# Patient Record
Sex: MALE | Race: WHITE | URBAN - METROPOLITAN AREA
[De-identification: names, ages, dates, MRNs, and addresses within clinical notes are randomized per-mention and may not be internally consistent; named-entity substitution may affect disease eponyms.]

---
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
## 2025-04-11 RX ORDER — DEXBROMPHENIRAMINE MALEATE, PHENYLEPHRINE HYDROCHLORIDE 2; 7.5 MG/1; MG/1
2 TABLET ORAL 3 TIMES DAILY PRN
Qty: 60 TABLET | Refills: 0 | Status: SHIPPED | OUTPATIENT
Start: 2025-04-11

## 2025-04-14 ENCOUNTER — OFFICE VISIT (OUTPATIENT)
Dept: ORTHOPEDICS | Facility: CLINIC | Age: 20
End: 2025-04-14
Payer: MEDICAID

## 2025-04-14 ENCOUNTER — HOSPITAL ENCOUNTER (OUTPATIENT)
Dept: RADIOLOGY | Facility: HOSPITAL | Age: 20
Discharge: HOME | End: 2025-04-14
Attending: NURSE PRACTITIONER
Payer: MEDICAID

## 2025-04-14 VITALS
OXYGEN SATURATION: 98 % | SYSTOLIC BLOOD PRESSURE: 111 MMHG | WEIGHT: 172.81 LBS | TEMPERATURE: 98 F | RESPIRATION RATE: 18 BRPM | HEART RATE: 58 BPM | BODY MASS INDEX: 24.74 KG/M2 | HEIGHT: 70 IN | DIASTOLIC BLOOD PRESSURE: 72 MMHG

## 2025-04-14 DIAGNOSIS — M23.92 KNEE INTERNAL DERANGEMENT, LEFT: Primary | ICD-10-CM

## 2025-04-14 DIAGNOSIS — M23.92 INTERNAL DERANGEMENT OF LEFT KNEE: ICD-10-CM

## 2025-04-14 PROCEDURE — 3008F BODY MASS INDEX DOCD: CPT | Mod: CPTII,,, | Performed by: NURSE PRACTITIONER

## 2025-04-14 PROCEDURE — 3078F DIAST BP <80 MM HG: CPT | Mod: CPTII,,, | Performed by: NURSE PRACTITIONER

## 2025-04-14 PROCEDURE — 73564 X-RAY EXAM KNEE 4 OR MORE: CPT | Mod: TC,LT

## 2025-04-14 PROCEDURE — 3074F SYST BP LT 130 MM HG: CPT | Mod: CPTII,,, | Performed by: NURSE PRACTITIONER

## 2025-04-14 PROCEDURE — 1160F RVW MEDS BY RX/DR IN RCRD: CPT | Mod: CPTII,,, | Performed by: NURSE PRACTITIONER

## 2025-04-14 PROCEDURE — 1159F MED LIST DOCD IN RCRD: CPT | Mod: CPTII,,, | Performed by: NURSE PRACTITIONER

## 2025-04-14 PROCEDURE — 99203 OFFICE O/P NEW LOW 30 MIN: CPT | Mod: S$PBB,,, | Performed by: NURSE PRACTITIONER

## 2025-04-14 PROCEDURE — 99214 OFFICE O/P EST MOD 30 MIN: CPT | Mod: PBBFAC,25 | Performed by: NURSE PRACTITIONER

## 2025-04-14 RX ORDER — LEVOCETIRIZINE DIHYDROCHLORIDE 5 MG/1
TABLET, FILM COATED ORAL
COMMUNITY

## 2025-04-14 NOTE — PROGRESS NOTES
"[facility]  Subjective:   PATIENT ID: Marco A Huertas is a 19 y.o. male.   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  Improved over time and last week while playing volley ball re-injure with same symptoms.     Onset: 1 year with acute injury 5 days ago constant   Modifying Factors: exacerbated by:  increased activity, prolonged sitting, prolonged walking/ standing improved with:  movement in less than 30 minutes , rest   Associated Symptoms:  [] joint locking  [x] joint catching  [x] decreased ROM  [x] crepitus [x] Weakness/ "giving out"  [] falls  [x] swelling  [x] difficult sleeping s/t pain        Activity: sedentary with light activity and pain moderately interferes with ADLs, assistive device none   Previous Treatments:  [x] HEP > 6 weeks  [] RX PT   [] Off-loading brace  [x] Hinged knee splint [x] OTC Pain Relievers: Tylenol, ibuprofen  [x] RX Medications: Naproxen  [] CSI   [] Hyaluronic acid injections    PMH:  non-smoker   Family History: unknown    NOTE: New patient referred for left knee pain with noted history of some conservative treatments.  Symptoms affecting ADLs.   Employment HX: construction, currently employed.     Current Medications[1]  Review of patient's allergies indicates:  No Known Allergies  REVIEW OF SYSTEMS:  A ten-point review of systems was performed and is negative, except as mentioned above   Objective:   Body mass index is 24.8 kg/m².   Vitals:    04/14/25 1313   BP: 111/72   Pulse: (!) 58   Resp: 18   Temp: 97.7 °F (36.5 °C)   TempSrc: Oral   SpO2: 98%   Weight: 78.4 kg (172 lb 13.5 oz)   Height: 5' 10" (1.778 m)   PainSc:   2   PainLoc: Knee     MSK Knee Exam  General:  no apparent distress, no pain indicators,  well " nourished  Inspection: lower extremities in proportion with overall body habitus, no erythema   ,   limping gait w/ weight bearing full  LEFT KNEE RIGHT KNEE   [x] Swelling moderate  [] Varus deformity  [] Rash [] Contusion  [] Mass  [] Scars [] Swelling  [] Varus deformity  [] Rash [] Contusion  [] Mass  [] Scars   Palpation:     LEFT KNEE RIGHT KNEE   Joint Warmth: normal  POMT: medial joint line,     [] Patellar grind with compression  [x] Crepitus  [x] Joint effusion  [x] Quad atrophy  [] Active mechanical locking with joint movement  [x] Popliteal fullness  [x] Tenderness medial joint line  [x] Tenderness lateral joint line  [] Tenderness of tibial plateau   [] Tenderness of patellar tendon  [] Tenderness of quadriceps tendon  [] Tenderness of prepatellar   [] Tenderness of infrapatellar  [] Tenderness of anserine bursae  [x] Tenderness of patellar facet  [] Tenderness over lateral retinaculum Joint Warmth: normal  POMT: none  [] Patellar grind with compression  [] Crepitus  [] Joint effusion  [] Quad atrophy  [] Active mechanical locking with joint movement  [] Popliteal fullness  [] Tenderness medial joint line  [] Tenderness lateral joint line  [] Tenderness of tibial plateau   [] Tenderness of patellar tendon  [] Tenderness of quadriceps tendon  [] Tenderness of prepatellar  [] Tenderness of infrapatellar  [] Tenderness of anserine bursae  [] Tenderness of patellar facet  [] Tenderness over lateral retinaculum   ROM Active Flexion / Extension (0-140)  Left 2 / 110 w/ pain Right 0 / 130 w/o pain   Strength Flexion / Extension (5 / 5)  Left 4 / 5 Right 5 / 5     Special Testing:         Not  Tested IT Band Syndrome L+ L-- R+ R--    [] Matt's Test [] [x] [] [x]     Joint Effusion        [] Ballotable Effusion [x] [] [] [x]    [] Fluid Wave [x] [] [] [x]     Patellar Testing        [] Apprehension [] [x] [] [x]     Meniscal Injury        [] Juan's Test [x] [] [] [x]    [x] Thessaly's Test [] [] [] []      "Ligament Injury        [] ACL Anterior Drawer [] [x] [] [x]    [] PCL Posterior Drawer [] [x] [] [x]    [] LCL Varus Test [] [x] [] [x]    [] MCL Valgus Test [] [x] [] [x]      Hip Exam normal  Ankle Exam normal  Neurovascular: Intact to light touch  Neuro/ Psych: Awake, alert, oriented, normal mood and affect  Lymphatic: No LAD  Skin/ Soft Tissue: no rash, skin intact  Cardio: no edema, vascular integrity noted   Assessment:   IMAGING:  XR Ordered by me today 4 views of left knee reviewed and independently interpreted by me with noted no acute findings noted, no medial joint space narrowing.  Awaiting radiologist findings.  Findings discussed with patient today.    MRI/CT: none    LABS: No results found for: "HGBA1C"  EMR REVIEW: completed with noted Referral documentation reviewed  Diagnosis & Treatment Plan:   DIAGNOSIS: Moderate exacerbation of acute Left  internal derangement with joint effusion   w/ mechanical catching  1. Knee internal derangement, left    2. BMI 24.0-24.9, adult      TREATMENT PLAN:  Orders Placed This Encounter    X-Ray Knee Complete 4 or More Views Left    MRI Knee Without Contrast Left      Treatment plan:    MRI ordered s/t failed conservative treatments including: RX NSAID, HEP > 3 months, and knee bracing with inadequate relief.  Patient continues with findings suggestive of meniscal injury despite > 6 weeks treatment.  MRI required for definitive DX and possible surgical treatment plans.  Ongoing education about DX, treatment recommendations and reasonable expectations including goal to decrease pain and increase function  Conservative treatments including, but limited to:  activity modification as needed, daily HEP with TheraBand, proper supportive footwear, non-impact muscle strengthening with use of stationary bike (RPM set at 80 or > with slow progression to goal of 40 minutes 3-4 times per week as tolerated), walking-aides as needed, adequate vit D/C, glucosamine 1500 mg/day " and/or daily acetaminophen 1000 mg 3 times/ day if able to tolerate.    Weight management is paramount. Immediate BMI reduction goal 5-10% of body weight.  Achieving a BMI < 25 would be optimal for overall health and symptoms relief.   Patient aware condition has no cure and treatment plan is focused on management of symptoms.  Procedure: n/a  RX Medications: continue medications as RX per PCP.  RTC:  after imaging complete   NOTE: None    Leah Menard-Neumann FNP [facility]  Procedure Note:   None  Time Based Billing   Total Time Spent with Patient: 30 minutes   Visit Start Time: 1300  10 minutes spent prior to visit reviewing EMR, prior labs and x-rays  10 minutes spent in visit with patient face-to-face time completing exam, obtaining history, educating on DX and treatment plan.  10 minutes spent after visit completing EMR documentation.   Visit End Time: 1330     *Please be aware that this note has been generated with the assistance of MModal voice-to-text.  Please excuse any spelling or grammatical errors. Positive findings indicted by checkmark*         [1]   Current Outpatient Medications:     dexbrompheniramine-phenyleph (ALAHIST PE) 2-7.5 mg Tab, Take 2 mg by mouth 3 (three) times daily as needed (congestion)., Disp: 60 tablet, Rfl: 0    levocetirizine (XYZAL) 5 MG tablet, 1 tab(s) orally once a day (in the evening), Disp: , Rfl:

## 2025-04-23 ENCOUNTER — HOSPITAL ENCOUNTER (OUTPATIENT)
Dept: RADIOLOGY | Facility: HOSPITAL | Age: 20
Discharge: HOME | End: 2025-04-23
Attending: NURSE PRACTITIONER
Payer: MEDICAID

## 2025-04-23 DIAGNOSIS — M23.92 KNEE INTERNAL DERANGEMENT, LEFT: ICD-10-CM

## 2025-04-23 PROCEDURE — 73721 MRI JNT OF LWR EXTRE W/O DYE: CPT | Mod: TC,LT

## 2025-04-24 ENCOUNTER — TELEPHONE (OUTPATIENT)
Dept: ORTHOPEDICS | Facility: CLINIC | Age: 20
End: 2025-04-24
Payer: MEDICAID

## 2025-04-24 ENCOUNTER — RESULTS FOLLOW-UP (OUTPATIENT)
Dept: ORTHOPEDICS | Facility: CLINIC | Age: 20
End: 2025-04-24

## 2025-04-24 NOTE — TELEPHONE ENCOUNTER
----- Message from Tanisha Edwards NP sent at 4/24/2025  9:54 AM CDT -----  MRI reviewed, please schedule w/ ortho res. ASAP for further eval. Thanks   ----- Message -----  From: Interface, Rad Results In  Sent: 4/24/2025   8:57 AM CDT  To: Tanisha Edwards NP

## 2025-05-07 ENCOUNTER — CLINICAL SUPPORT (OUTPATIENT)
Dept: ORTHOPEDICS | Facility: CLINIC | Age: 20
End: 2025-05-07
Payer: MEDICAID

## 2025-05-07 VITALS
SYSTOLIC BLOOD PRESSURE: 120 MMHG | HEIGHT: 70 IN | WEIGHT: 196.44 LBS | OXYGEN SATURATION: 100 % | RESPIRATION RATE: 20 BRPM | HEART RATE: 79 BPM | BODY MASS INDEX: 28.12 KG/M2 | TEMPERATURE: 98 F | DIASTOLIC BLOOD PRESSURE: 78 MMHG

## 2025-05-07 DIAGNOSIS — S83.519A ACL (ANTERIOR CRUCIATE LIGAMENT) RUPTURE: ICD-10-CM

## 2025-05-07 DIAGNOSIS — S83.512A RUPTURE OF ANTERIOR CRUCIATE LIGAMENT OF LEFT KNEE, INITIAL ENCOUNTER: Primary | ICD-10-CM

## 2025-05-07 PROCEDURE — 99214 OFFICE O/P EST MOD 30 MIN: CPT | Mod: PBBFAC

## 2025-05-07 RX ORDER — CEFAZOLIN SODIUM 2 G/50ML
2 SOLUTION INTRAVENOUS
OUTPATIENT
Start: 2025-05-07

## 2025-05-07 RX ORDER — MUPIROCIN 20 MG/G
OINTMENT TOPICAL
OUTPATIENT
Start: 2025-05-07

## 2025-05-07 RX ORDER — SODIUM CHLORIDE 9 MG/ML
INJECTION, SOLUTION INTRAVENOUS CONTINUOUS
OUTPATIENT
Start: 2025-05-07

## 2025-05-07 NOTE — PROGRESS NOTES
[facility]  Established Patient Office Visit  05/07/2025       Patient ID: Marco A Moffett  YOB: 2005  MRN: [id number]    Chief Complaint: Pain of the Left Knee (Patient here for a follow up and MRI results of his left knee. Patient states the pain is on and off in the back of then knee. When he bends the knee to much is when he has the pain. Not taking anything for pain)    Past Orthopaedic Surgeries: None    HPI:  Marco A Mofeftt is a 19 y.o. male status post twisting injury just over 3 weeks ago resulting in an isolated ACL rupture of the left knee.  Since that point the patient has returned to his normal activities and only has minor pain certain movements of the knee.  Patient feels that his knee is overall pretty stable does not give out on him.  Patient says that although he feels pretty good he does want to get his knee fixed so he can wear turned back to his normal activities.  Patient states that he has a history of vasovagal syncope whenever he sees blood bradycardia to the 50s however neither of these are pathologic and were previously seen by a cardiologist and he is cleared for all normal activity.  Patient denies any medication use.    12 point ROS performed and negative except as above.     Past Medical History:    History reviewed. No pertinent past medical history.  History reviewed. No pertinent surgical history.  Family History   Problem Relation Name Age of Onset    Alcohol abuse Maternal Grandmother Cady jordy     Drug abuse Maternal Grandmother Cady jordy     Alcohol abuse Paternal Grandfather Reinaldo moffett     COPD Paternal Grandfather Reinaldo moffett     Diabetes Paternal Grandmother Charisse moffett     Early death Paternal Grandmother Charisse moffett     Hearing loss Paternal Grandmother Charisse moffett     Heart disease Paternal Grandmother Charisse moffett      Social History[1]  Medication List with Changes/Refills   Current Medications     DEXBROMPHENIRAMINE-PHENYLEPH (ALAHIST PE) 2-7.5 MG TAB    Take 2 mg by mouth 3 (three) times daily as needed (congestion).    LEVOCETIRIZINE (XYZAL) 5 MG TABLET    1 tab(s) orally once a day (in the evening)     Review of patient's allergies indicates:  No Known Allergies    Physical Exam:  Left Lower extremity:  Small old scars from previous superficial lacerations  No visible effusion  No tenderness to palpation along the medial or lateral joint lines  Negative Juan's  Lachman 2B  Abnormal tibial translation with anterior drawer  Negative posterior drawer  Motor intact: EHL/FHL/TA/GSC  SILT: DP/SP/T/Stoddard/Sa  Knee range of motion from full extension to 130° flexion  Palpable DP pulse    Imaging independently interpreted:  MRI Left Knee 04/14/25-    1. Complete ACL tear.  2. Patchy marrow edema of the posterolateral tibial plateau.  Mild marrow edema involving the medial aspect of the medial femoral condyle.  3. Small knee effusion    Assessment and Plan:  Marco A Huertas is a 19 y.o. male status post left knee ACL tear.    Addendum * - We spoke to the anesthesia team at our surgical center who stated that there is no requirement for pre-op clearance for these patient's diagnosed vasovagal syncope in response to seeing blood    - book the patient for left knee arthroscopy and ACL reconstruction using autograft  - activities as tolerated  - booked and consented in clinic today    Inocente Caldera MD  [employer] Orthopedic Surgery PGY-3               [1]   Social History  Socioeconomic History    Marital status: Single   Tobacco Use    Smoking status: Never     Passive exposure: Never    Smokeless tobacco: Never   Substance and Sexual Activity    Alcohol use: Yes     Alcohol/week: 3.0 standard drinks of alcohol     Types: 1 Glasses of wine, 2 Cans of beer per week     Comment: weekends occ    Drug use: Never    Sexual activity: Not Currently     Partners: Female     Birth control/protection: Condom

## 2025-05-07 NOTE — PROGRESS NOTES
Faculty Attestation: Marco A Huertas  was seen at [facility] in the Orthopaedic Clinic in the outpatient department at a [facility]. Patient seen and evaluated at the time of the visit.  I participated in the management of the patient and was immediately available throughout the encounter. History of Present Illness, Physical Exam, and Assessment and Plan reviewed. Treatment plan is reasonable and appropriate. Compliance with treatment recommendations is important. No procedure was performed.     Sarkis Armas MD  Orthopedic Surgery Chief

## 2025-05-07 NOTE — LETTER
[facility]  [address] [place] [address]  Phone: [phone]     How to Prepare for Surgery (Orthopedic)  About this topic   There are some things that are common for most kinds of surgery. These help to keep you safe. Learn what you can do to get ready for your surgery. This will help you and the team caring for you during your surgery. Also, learn about the things the doctor and nurses do to keep you safe during surgery.  You may have outpatient surgery where you come in the day of your surgery and then go home hours after your surgery.  In other cases, you may be admitted to the hospital the day before your surgery, or you may have to stay in the hospital after your surgery.  General   Weeks before your surgery:  These are things you can do to lower your chances of having problems after your surgery:  Stop smoking if you are a smoker. Avoid being around others who smoke for several weeks before and after your surgery. Smoking limits how much oxygen gets to your body for healing.  If you have diabetes, keep your blood sugars as near normal as you can. This helps lower your chance of infection or other problems after your surgery.  Talk to your doctor about all the drugs you take. This includes over-the-counter drugs, natural products, and vitamins.  There may be some you can take the day of your surgery. If you have diabetes, talk to your doctor about your drugs, especially if you are on insulin. Your doctor may tell you to take less than usual on the day of your surgery.  If you take insulin or any diabetic medication do not take the morning of surgery. __________________________________________________________________________________  Your doctor may want you to stop taking some of your drugs before surgery. Some drugs and natural products make it harder for your blood to clot. Then, you may have more bleeding during or after surgery. Be sure to tell your doctor if  you are taking any drugs that may cause bleeding.   Common Medication Perioperative Consideration Stop prior to surgery   Aspirin Risk for Bleeding 7 days   NSAID's (antiinflammatories) Examples: Mobic/Meloxicam  Advil/Motrin/Ibuprofen/Aleve  Naproixen/Naprosyn  Voltaren/Diclofenac/Arthotec  /Celebrex/Celecoxib  /Toradol/Ketorolac  Ralafen/Nabumetone/Arthotec Risk for Bleeding 7 days   Phentermine/Diet Pills  Anesthesia, BP, Cardiac Side Effects 7 days   GIP/Glp-1 Agonist  Examples: Trulicity (Dulaglutide) Ozempic/Wegovy (Semaglutide) Victoza/Saxenda (liraglutide) Mounjaro (Trizepatide) Delayed Gastric Emptying causing Increase risk of aspiration in surgery 14 days   Omega-3/Fish oil Increase risk for bleeding 7 days    Medication that needs to be stopped ____________________________________________________________________________________________________________________________________________________________________________________________  Herbal Supplements you should stop     Herbal Supplement Operative Concerns Stop before Surgery   Echinacea Can be associated with allergic reaction 7 days   Ephedra (Mahung) Increase blood pressure and heart rate with anesthesia 7 days   All Vitamins, CBD supplement, Garlic, Saadia, Tumeric,Umary Increase risk of bleeding 7 days   Gingko Biloba Increase risk of bleeding 7 days   Ginseng Low Glucose, decrease effects of coumadin 7 days   Kava Increased sedation with anesthesia  7 days   Ben Wort Multiple drug interaction with coumadin, steroids, and some heart medication 7 days   Valerian Increase sedation Taper dose  14 days before surgery   Phentermine Cardiac side effects 7 days   Fenfluramine (Phen Phen) Cardiac side effects 7 days      Have the tests done that your doctor orders before your surgery. Your doctor may want you to have lab tests, an x-ray, or EKG to see how healthy you are. Having these tests helps the doctor plan for your care during surgery.  Mild  exercise like walking, riding a bike, or swimming may be good for you. It may help you breathe more easily before and after surgery. Ask your doctor if it is OK for you to exercise before surgery. Also, practice taking deep breaths. Often after surgery, you will be asked to take deep breaths and cough. This may help prevent lung infections.  Follow a healthy diet and eat nutritious, well-balanced meals. Eat a healthy meal for supper the day before your surgery. Avoid beer, wine, and mixed drinks (alcohol).  You will not be allowed to drive right away after surgery. Ask a family member or a friend to drive you home.  Most often your doctor will want you to have an adult stay with you for at least 24 hours. Arrange with family or friends to stay with you on the first day after your surgery or when you go home.  If you get sick with a cold, infection, or other illness in the 2 weeks before your surgery, call your doctor's office. You may need to change when you have surgery because you may be more at risk for infection or other problems.  Check your skin for rashes, cuts, insect bites, or any skin infections and report these to your doctor before surgery. Pay attention to the areas that you cannot see easily, such as the bottoms of your feet and back. Check in skin folds for any bumps or skin rashes.  Shaving should be stopped at least 2 days before surgery on all areas of the body including the face, legs, underarms, etc.   Day before and morning of your surgery:  Wash your hair and take a bath or shower before your surgery (around 7 pm). The doctor may give you special soap or wipes to wash with before the surgery to lessen the germs on your skin. Follow the directions how to use this special soap or wipes provided by your doctor. Your skin should be completely dry and cool. When applied to sensitive skin, the cloths may irritate the skin such as temporary itching sensation and /or redness.  If itching or redness  persists, rinse affected area and discontinue use.  Clean skin in the following order using one cloth for each step.   AVOID CONTACT WITH EYES, EARS, MOUTH,AND MUCOUS MEMBRANES (VAGINAL OR RECTAL).                     YOU MUST USE ALL OF THE CLOTHS  Cloth #1  Wipe YOUR Neck and chest.  Cloth #2  Wipe both arms to fingertips and both armpits.  Cloth #3  Wipe both hips followed by groin area. Be sure to wipe the folds of your stomach and groin.  DO NOT use on vaginal and rectal areas.  Cloth #4  Wipe both legs starting at the thigh and ending at the toes.  Cloth #5  Wipe your back.  Cloth #6  Wipe your buttocks.  Do not use body lotions, perfumes, powders, or creams on your skin, especially near the surgical site. Do not wear makeup, especially eye makeup.  You will also need to take off nail polish and jewelry. Remove any jewelry from body piercings.  Stop eating and drinking at the time you are told to. This helps to make sure that your stomach is empty while you are under anesthesia.  Brush your teeth before your surgery. Take extra care not to drink any water while you brush. If your child is having surgery, watch that your child does not drink any water while they brush.  Leave all valuables and jewelry at home.  What to expect when you arrive for surgery:  At the hospital or surgery center, the staff will work to get you ready for your surgery. Here are some of the things that will happen before you get to the operating room:  You will have a bracelet that has your name, birth date, and other information on it. Staff may check this bracelet often or ask you your name and birthdate. This is a safety check to make sure they have the right patient.  If you have allergies, you may have to wear a bracelet with them listed on it. You may also have a bracelet to tell staff that you are at a higher risk of falling.  If you have a history of sleep apnea and use a CPAP machine for sleep, please bring the CPAP with you if  you are spending the night after your surgery.  You will change out of your clothing and wear a hospital gown. You will need to take off your glasses and remove contact lenses, hearing aids, and dentures before your surgery.  The staff will:  Give you warm blankets or use a warming blanket so you are not cold.  Take your temperature and blood pressure. They may also ask about or check your height and weight.  Ask questions about your health history and allergies. You may have to tell this information to others as well. This is another safety check.  Put an IV in your hand or arm to give you fluids and drugs. You may be given a drug to make you sleepy.  The staff may:  Give you a special mouthwash to use. This helps lower the number of germs in your mouth.  Put special stockings or boots on your legs and feet to help with blood flow.  Use clippers to remove hair around the area of your surgical cut, depending on the site of your surgery.  The anesthesia team will:  Ask about your history and allergies.  Ask you to sign a consent after they speak with you about their anesthesia plan for you.  Want to know if you have any loose teeth before your surgery.  Talk with you about your surgery and what they will do to keep you comfortable during surgery.  Have you meet people who will be in the operating room with you.  Your doctor will:  Talk with you about your surgery and the risks and benefits. Be sure to ask any questions you may have. You may need to sign a consent form if you have not already done so.  Talk with you about the possible need for blood products. You may be asked to sign a consent for blood products as well.  Sign or zoran the part of your body where you will be having surgery. This is a safety check.    What will the results be?   You will be ready for your surgery.  What drugs may be needed?   The doctor may order drugs before your surgery to:  Help with fear or worry and help you to relax  Prevent  infection  Prevent blood clots  The doctor may order drugs after surgery to:  Help lessen pain  Help prevent or lessen upset stomach  Prevent infection  When do I need to call the doctor?   Signs of infection. These include a fever of 100.4°F (38°C) or higher, chills.  If you have a cough, cold, fever, or become ill a few days before you are scheduled to have surgery. Your doctor may want to reschedule it.  If you do not have a ride to and from your surgery  If you have any skin rashes, cuts, boils, or infections on your skin. Your doctor may want to reschedule the surgery since this can put you at risk for wound infection after your surgery.  Helpful tips   Wear loose clothing and comfortable shoes that are easy to put on and take off.  Remove all body piercings before you come for your surgery.  Bring these things with you to the hospital:  Your insurance card and photo ID  A copy of your advance directive if you have one  A list of all drugs that you take and the amounts that you take  A list of all your allergies and the kind of reaction they cause you  Make sure you will be able to move about your home easily after your surgery. You may need extra pillows or a recliner to rest in.  Have foods in your home that will be easy on your belly after surgery. You may want things like juices, crackers, soups, and Jello.

## 2025-05-14 ENCOUNTER — PATIENT MESSAGE (OUTPATIENT)
Dept: SURGERY | Facility: HOSPITAL | Age: 20
End: 2025-05-14
Payer: MEDICAID

## 2025-05-14 ENCOUNTER — ANESTHESIA EVENT (OUTPATIENT)
Dept: SURGERY | Facility: HOSPITAL | Age: 20
End: 2025-05-14
Payer: MEDICAID

## 2025-05-14 PROBLEM — S83.512A LEFT ANTERIOR CRUCIATE LIGAMENT TEAR: Status: ACTIVE | Noted: 2025-05-14

## 2025-05-14 NOTE — OP NOTE
Orthopedic Surgery Operative Note     Date of Service: 05/15/25     Pre-Operative Diagnosis:  Left knee ACL rupture    Post-Operative Diagnosis: Same     Procedure(s):   1. Left knee arthroscopy with ACL reconstruction using hamstring autograft     Anesthesia: General     Surgeon:   Dr. Tomer MD, was present and scrubbed for the key portions of the procedure.     Assistant(s):   Inocente Caldera MD    Indications   Patient is a 19 y.o.male with left ACL rupture. The patient was checked again in the Holding Room. The risks, benefits, complications, treatment options, and expected outcomes were reviewed again with the patient. The patient has elected to proceed with ACL reconstruction using hamstring autograft. The risks and potential complications include but are not limited to infection, nerve injury, vascular injury, persistent pain, potential skin necrosis, deep vein thrombosis, possible pulmonary embolus, complications of the anesthetics and failure of the implants with potential need for future surgery to remove or revise. The patient concurred with the proposed plan, giving informed consent. The site of surgery was identified by the patient and properly noted/marked by me.     Implant:   1. Tight rope suture button for femoral tunnel   2. Bioabsorbable interference screw     Procedure Details:   The patient was taken to the operating room. A Time-Out was held and the patient, procedure and laterality were verified and agreed upon by all members of the operating room staff. Prophylactic antibiotics were given.The patient was given general anesthesia.  Nonsterile tourniquet was applied to the upper thigh.  The left lower extremity was then sterilized with alcohol followed by chlorhexidine solution and then draped in standard fashion.  The nonsterile tourniquet was inflated.    We began with our hamstrings autograft.  We made an incision at the anteromedial proximal tibia over the palpable pes anserinus.  We bluntly  dissected subcutaneous tissues with Metzenbaum scissors buzzing small bleeders with electrocautery as we progressed.  We then discovered the sartorius fascia.  We incised sharply along the sartorius fascia along the medial insertion of the pes anserinus and then lifted our flap up laterally until it revealed an anterior and proximal gracilis tendon and a posterior and distal semi tendinosis tendon at the underside of the sartorius fascia.  We carefully divided each tendon from its underlying adhesions in the adhesions to 1 another until we had adequate length to throw locking sutures into our tendon ends for tension.  With that tension we then ensured that we had no further adhesions and a clear path up to the muscle bellies.  At that point we used our closed tendon stripper to procure the gracilis tendon followed by the semi tendinosis tendon which we performed without complication.  We then began flipping both the gracilis and semi tendinosis tendons on the back table in order to make an 8.5 mm thick graft that was whip stitched together by our skilled chief resident Dr. Esquivel.  While Dr. Esquivel prepared our hamstring autograft we made our anterolateral portal at the knee and performed our diagnostic arthroscopy investigating the suprapatellar pouch 1st where we found the trochlear notch and patella facets without any injury.  There were no loose bodies in the suprapatellar pouch or the medial or lateral gutters.  We then moved the camera down to the medial compartment and established our anteromedial portal.  We then placed a probe and probed both our medial and lateral menisci aware no injury was found.  The cartilage appeared healthy in both the tibial plateau and the femoral condyles.  We then turned our attention to the trochlear notch where there was no visible ACL and only a small stump at the medial aspect of the lateral femoral condyle within the notch.  We then used our shaver to debride the  ligamentum mucosa and the remaining ACL stump at the origin and insertion and performed a trochlear plasty with our bur and smoothed out the medial aspect of the lateral femoral condyle trochlear notch.    At that point we wanted to establish our femoral tunnel so we switched our viewing portal to the anteromedial portal and then placed our outside in femoral tunnel guide at the medial aspect of the lateral femoral condyle where the native ACL origin lay.  We then made our incision at the corresponding aspect of the lateral distal thigh where our drill bit would go through and we passed our drill bit through the IT band through the lateral femoral cortex and into the trochlear notch had an excellent position.  We then malleted our guide down into the bone to keep our path and then exchanged our drill bit for our flip cutter.  We placed a 3.5 mm to flip  into the trochlear notch and then while off bone expanded it to an 8.5 mm setting and withdrew it approximately 25 mm.  At this point we then turned it back to 3.5 mm and withdrew our flip cutter.  We then passed our passing stitch through the tunnel and secured it.  We then turned our attention to the tibial tunnel where we located our insertion of the ACL on our tibial plateau and with our tibial guide set to 60° we drilled our pin to the appropriate location in the tibial plateau followed by a Reamer.  We then cleaned out both our femoral and tibial tunnels with our shaver.  Next we passed our completed hamstring autograft into the joint and then pulled it down through the tibial tunnel.  We visualized the tight rope flipping on the lateral femoral cortex and ensured that it had excellent tension.  We then while holding tension cycled our graft 25 times.  Next we placed our Nitinol wire at the anterior aspect of the graft and then followed this with our 8 mm biodegradable interference screw which we placed while performing a posterior drawer on the  lower extremity.  Then with the remaining suture at the distal end of the tendon we dunked this into a SwiveLock at the anteromedial tibia.  We performed an anterior drawer and the patient had a significant improvement in stability going from a Lachman 2B to a Lachman 1A.  We then washed out all wounds and irrigated out the joint sucking all debris clear of the joint.  Our anteromedial tibial incision and our lateral distal thigh incision were closed deep with 2-0 Vicryl suture and then subcutaneous tissues for all incisions were closed with 3-0 Monocryl suture and then over the skin Dermabond and Steri-Strips were placed.  We then injected 10 cc of lidocaine 1% without epinephrine into the joint for pain relief.  We then dressed all wounds with a 4 x 4 gauze followed by abdominal pads sterile cast padding and a loosely wrapped Ace bandage.  The extremity was then placed in a hinged knee brace which was locked in extension.    Findings:   1. ACL rupture  2. Intact medial and lateral menisci     Estimated blood loss: 40 cc     Drains:  None     Total IV fluids: Per anesthesia records     Specimens: None     Complications: None, pt tolerated the procedure well     Disposition: Awakened from anesthesia, and taken to the recovery room in a stable condition, having suffered no apparent untoward event     Condition: doing well without problems    Post-Operative Management   - weight-bearing as tolerated locked in extension hinged knee brace  - aspirin twice daily for DVT prophylaxis    Inocente Caldera MD  [facility] Orthopedic Surgery PGY-3

## 2025-05-14 NOTE — H&P
Orthopedic H&P Note    Patient is 19M indicated for LEFT ACL reconstruction, possible meniscus debridement versus repair and hamstring autograft.     I have seen and examined the patient and there are no changes since her last clinic visit.    Patient is marked consented and all questions answered.    Inocente Caldera MD  [facility] Orthopedic Surgery PGY-3    _______________________________________________________      [facility]  Established Patient Office Visit  05/07/2025         Patient ID: Marco A Huertas  YOB: 2005  MRN: [id number]     Chief Complaint: Pain of the Left Knee (Patient here for a follow up and MRI results of his left knee. Patient states the pain is on and off in the back of then knee. When he bends the knee to much is when he has the pain. Not taking anything for pain)     Past Orthopaedic Surgeries: None     HPI:  Marco A Huertas is a 19 y.o. male status post twisting injury just over 3 weeks ago resulting in an isolated ACL rupture of the left knee.  Since that point the patient has returned to his normal activities and only has minor pain certain movements of the knee.  Patient feels that his knee is overall pretty stable does not give out on him.  Patient says that although he feels pretty good he does want to get his knee fixed so he can wear turned back to his normal activities.  Patient states that he has a history of vasovagal syncope whenever he sees blood bradycardia to the 50s however neither of these are pathologic and were previously seen by a cardiologist and he is cleared for all normal activity.  Patient denies any medication use.     12 point ROS performed and negative except as above.      Past Medical History:     History reviewed. No pertinent past medical history.  History reviewed. No pertinent surgical history.         Family History   Problem Relation Name Age of Onset    Alcohol abuse Maternal Grandmother Cady spence      Drug abuse  Maternal Grandmother Cady spence      Alcohol abuse Paternal Grandfather Reinaldo moffett      COPD Paternal Grandfather Reinaldo moffett      Diabetes Paternal Grandmother Charisse moffett      Early death Paternal Grandmother Charisse moffett      Hearing loss Paternal Grandmother Charisse moffett      Heart disease Paternal Grandmother Charisse moffett        [Social History]    [Social History]        Socioeconomic History    Marital status: Single   Tobacco Use    Smoking status: Never       Passive exposure: Never    Smokeless tobacco: Never   Substance and Sexual Activity    Alcohol use: Yes       Alcohol/week: 3.0 standard drinks of alcohol       Types: 1 Glasses of wine, 2 Cans of beer per week       Comment: weekends occ    Drug use: Never    Sexual activity: Not Currently       Partners: Female       Birth control/protection: Condom          Medication List with Changes/Refills   Current Medications     DEXBROMPHENIRAMINE-PHENYLEPH (ALAHIST PE) 2-7.5 MG TAB    Take 2 mg by mouth 3 (three) times daily as needed (congestion).     LEVOCETIRIZINE (XYZAL) 5 MG TABLET    1 tab(s) orally once a day (in the evening)      Review of patient's allergies indicates:  No Known Allergies     Physical Exam:  Left Lower extremity:  Small old scars from previous superficial lacerations  No visible effusion  No tenderness to palpation along the medial or lateral joint lines  Negative Juan's  Lachman 2B  Abnormal tibial translation with anterior drawer  Negative posterior drawer  Motor intact: EHL/FHL/TA/GSC  SILT: DP/SP/T/Stoddard/Sa  Knee range of motion from full extension to 130° flexion  Palpable DP pulse     Imaging independently interpreted:  MRI Left Knee 04/14/25-     1. Complete ACL tear.  2. Patchy marrow edema of the posterolateral tibial plateau.  Mild marrow edema involving the medial aspect of the medial femoral condyle.  3. Small knee effusion     Assessment and Plan:  Marco A Moffett is a 19 y.o. male status post left knee ACL  tear.     Addendum * - We spoke to the anesthesia team at our surgical center who stated that there is no requirement for pre-op clearance for these patient's diagnosed vasovagal syncope in response to seeing blood     - book the patient for left knee arthroscopy and ACL reconstruction using autograft  - activities as tolerated  - booked and consented in clinic today     Inocente Caldera MD  [employer] Orthopedic Surgery PGY-3

## 2025-05-14 NOTE — DISCHARGE SUMMARY
[facility]  Discharge Note  Short Stay    Procedure(s) (LRB):  RECONSTRUCTION, KNEE, ACL, ARTHROSCOPIC (Left)      OUTCOME: Patient tolerated treatment/procedure well without complication and is now ready for discharge.    DISPOSITION: Home or Self Care    FINAL DIAGNOSIS:  Left knee ACL rupture    FOLLOWUP: In clinic    DISCHARGE INSTRUCTIONS:    Discharge Procedure Orders   Change dressing (specify)   Order Comments: See instructions     Weight bearing restrictions (specify):        TIME SPENT ON DISCHARGE: 10 minutes    Inocente Caldera MD  [employer] Orthopedic Surgery PGY-3

## 2025-05-14 NOTE — DISCHARGE INSTRUCTIONS
Okay to put weight through the leg as long as you are locked in full extension in your brace  Aspirin daily to prevent blood clots  You may remove are dressings in 3 days and replace with the around clean gauze and wrap  You may have running water over her incisions after 7 days___________________________________________________________________________________________________    · Keep follow up appointment on May 28th at 2:30 PM at [facility].    `  Resume home medications unless otherwise instructed by your doctor.     · Take pain medications as prescribed.  1.) Aspirin 81 mg,  take twice daily (morning and evening), to help prevent blood clots;  2.) Naproxen, this is a non-steroidal anti-inflammatory medicine that you need to take twice a day - about 12 hours apart;  3.)Gabapentin, this is used to treat pain in the nerves as a result of the surgery - take this three times daily, @ about 8 hours apart.  4.) Cyclobenzaprine (Flexeril), this is a muscle relaxant that you can take three times daily- as needed @ about 8 hours apart   4.) Hydrocodone-Acetaminophen (Norco), this a narcotic that you can take (in ADDITION TO  the other medicines) every 8 hours as need for the more severe/persistent pain    `  -Keep knee immobilizer locked when putting weight on the surgical leg    · Keep knee elevated on pillow, higher than the level of your heart,  to decrease pain and swelling.    · No driving or consuming alcohol for the next 24 hours or while taking narcotic pain medicine.    · May apply ice pack to surgical area for 20 minutes at a time 6-8 times per day.    ·   ·   · Notify MD of any moderate to severe pain unrelieved by pain medicine or for any signs of infection including fever above 100.4, excessive redness or swelling, yellow/green foul- smelling drainage, nausea or vomiting. Call clinic at: [phone]. After business hours, if your concern is an emergency, you may need to be evaluated at an  urgent care or emergency department  · Thanks for choosing [facility]! Have a smooth recovery!

## 2025-05-14 NOTE — ANESTHESIA PREPROCEDURE EVALUATION
05/14/2025  Marco A Huertas is a 19 y.o., male with no significant PMHx presents for Lt ACL reconstruction.      Vitals:    05/15/25 0619 05/15/25 0629   BP:  126/77   BP Location:  Right arm   Patient Position:  Sitting   Pulse:  68   Resp:  16   Temp:  36.4 °C (97.6 °F)   TempSrc:  Oral   SpO2:  99%   Weight: 88.8 kg (195 lb 12.8 oz)        NO BETA BLOCKER USE    History of syncopal episode with IV start; sight of blood --> Valium PO ordered on SDS    Active Ambulatory Problems     Diagnosis Date Noted    Knee internal derangement, left 04/14/2025    BMI 24.0-24.9, adult 04/14/2025     Resolved Ambulatory Problems     Diagnosis Date Noted    No Resolved Ambulatory Problems     No Additional Past Medical History       Pre-op Assessment    I have reviewed the Patient Summary Reports.     I have reviewed the Nursing Notes. I have reviewed the NPO Status.   I have reviewed the Medications.     Review of Systems  Anesthesia Hx:  No problems with previous Anesthesia   History of prior surgery of interest to airway management or planning:          Denies Family Hx of Anesthesia complications.    Denies Personal Hx of Anesthesia complications.                    Hematology/Oncology:  Hematology Normal   Oncology Normal                                   EENT/Dental:  EENT/Dental Normal           Cardiovascular:  Cardiovascular Normal                                              Pulmonary:  Pulmonary Normal                       Renal/:  Renal/ Normal                 Hepatic/GI:  Hepatic/GI Normal                    Musculoskeletal:  Musculoskeletal Normal                Neurological:  Neurology Normal                                      Endocrine:  Endocrine Normal            Dermatological:  Skin Normal    Psych:  Psychiatric Normal                    Physical Exam  General: Alert    Airway:  Mallampati: I /  I  Mouth Opening: Normal  TM Distance: Normal  Tongue: Normal  Neck ROM: Normal ROM    Dental:  Intact        Anesthesia Plan  Type of Anesthesia, risks & benefits discussed:    Anesthesia Type: Gen ETT, Regional  Intra-op Monitoring Plan: Standard ASA Monitors  Post Op Pain Control Plan: multimodal analgesia and IV/PO Opioids PRN  Induction:  IV  Airway Plan: Direct  Informed Consent: Informed consent signed with the Patient and all parties understand the risks and agree with anesthesia plan.  All questions answered. Patient consented to blood products? No  ASA Score: 1  Day of Surgery Review of History & Physical: H&P Update referred to the surgeon/provider.H&P completed by Anesthesiologist.    Ready For Surgery From Anesthesia Perspective.     .

## 2025-05-15 ENCOUNTER — HOSPITAL ENCOUNTER (OUTPATIENT)
Facility: HOSPITAL | Age: 20
Discharge: HOME | End: 2025-05-15
Attending: SPECIALIST | Admitting: SPECIALIST
Payer: MEDICAID

## 2025-05-15 ENCOUNTER — ANESTHESIA (OUTPATIENT)
Dept: SURGERY | Facility: HOSPITAL | Age: 20
End: 2025-05-15
Payer: MEDICAID

## 2025-05-15 DIAGNOSIS — S83.519A ACL (ANTERIOR CRUCIATE LIGAMENT) RUPTURE: ICD-10-CM

## 2025-05-15 DIAGNOSIS — S83.512A RUPTURE OF ANTERIOR CRUCIATE LIGAMENT OF LEFT KNEE, INITIAL ENCOUNTER: Primary | ICD-10-CM

## 2025-05-15 PROCEDURE — C1713 ANCHOR/SCREW BN/BN,TIS/BN: HCPCS | Performed by: SPECIALIST

## 2025-05-15 PROCEDURE — 36000711: Performed by: SPECIALIST

## 2025-05-15 PROCEDURE — 63600175 PHARM REV CODE 636 W HCPCS: Performed by: NURSE ANESTHETIST, CERTIFIED REGISTERED

## 2025-05-15 PROCEDURE — 37000008 HC ANESTHESIA 1ST 15 MINUTES: Performed by: SPECIALIST

## 2025-05-15 PROCEDURE — 71000033 HC RECOVERY, INTIAL HOUR: Performed by: SPECIALIST

## 2025-05-15 PROCEDURE — 63600175 PHARM REV CODE 636 W HCPCS

## 2025-05-15 PROCEDURE — 63600175 PHARM REV CODE 636 W HCPCS: Performed by: ANESTHESIOLOGY

## 2025-05-15 PROCEDURE — 29888 ARTHRS AID ACL RPR/AGMNTJ: CPT | Mod: LT,,, | Performed by: SPECIALIST

## 2025-05-15 PROCEDURE — 25000003 PHARM REV CODE 250: Performed by: SPECIALIST

## 2025-05-15 PROCEDURE — 63600175 PHARM REV CODE 636 W HCPCS: Performed by: SPECIALIST

## 2025-05-15 PROCEDURE — 25000003 PHARM REV CODE 250: Performed by: NURSE ANESTHETIST, CERTIFIED REGISTERED

## 2025-05-15 PROCEDURE — 36000710: Performed by: SPECIALIST

## 2025-05-15 PROCEDURE — 71000016 HC POSTOP RECOV ADDL HR: Performed by: SPECIALIST

## 2025-05-15 PROCEDURE — 25000003 PHARM REV CODE 250: Performed by: ANESTHESIOLOGY

## 2025-05-15 PROCEDURE — 27201423 OPTIME MED/SURG SUP & DEVICES STERILE SUPPLY: Performed by: SPECIALIST

## 2025-05-15 PROCEDURE — 37000009 HC ANESTHESIA EA ADD 15 MINS: Performed by: SPECIALIST

## 2025-05-15 PROCEDURE — 71000015 HC POSTOP RECOV 1ST HR: Performed by: SPECIALIST

## 2025-05-15 DEVICE — Ø8X 30MM BC IF SCRW, VENTED
Type: IMPLANTABLE DEVICE | Site: KNEE | Status: FUNCTIONAL
Brand: ARTHREX®

## 2025-05-15 DEVICE — IMPLSYS 2NDRY FIXATN PEEKSWVLK 4.75X19.1
Type: IMPLANTABLE DEVICE | Site: KNEE | Status: FUNCTIONAL
Brand: ARTHREX®

## 2025-05-15 DEVICE — TIGHTROPE ® II RT WITH DEPLOYING SUTURE
Type: IMPLANTABLE DEVICE | Site: KNEE | Status: FUNCTIONAL
Brand: ARTHREX®

## 2025-05-15 RX ORDER — DIAZEPAM 5 MG/1
10 TABLET ORAL ONCE
Status: COMPLETED | OUTPATIENT
Start: 2025-05-15 | End: 2025-05-15

## 2025-05-15 RX ORDER — LIDOCAINE HYDROCHLORIDE 20 MG/ML
INJECTION INTRAVENOUS
Status: DISCONTINUED | OUTPATIENT
Start: 2025-05-15 | End: 2025-05-15

## 2025-05-15 RX ORDER — MORPHINE SULFATE 2 MG/ML
2 INJECTION, SOLUTION INTRAMUSCULAR; INTRAVENOUS EVERY 5 MIN PRN
Status: DISCONTINUED | OUTPATIENT
Start: 2025-05-15 | End: 2025-05-15 | Stop reason: HOSPADM

## 2025-05-15 RX ORDER — NAPROXEN SODIUM 220 MG/1
81 TABLET, FILM COATED ORAL 2 TIMES DAILY
Qty: 84 TABLET | Refills: 0 | Status: SHIPPED | OUTPATIENT
Start: 2025-05-15 | End: 2025-06-26

## 2025-05-15 RX ORDER — GABAPENTIN 300 MG/1
300 CAPSULE ORAL 3 TIMES DAILY
Qty: 90 CAPSULE | Refills: 0 | Status: SHIPPED | OUTPATIENT
Start: 2025-05-15 | End: 2025-06-14

## 2025-05-15 RX ORDER — GLYCOPYRROLATE 0.2 MG/ML
INJECTION INTRAMUSCULAR; INTRAVENOUS
Status: DISCONTINUED | OUTPATIENT
Start: 2025-05-15 | End: 2025-05-15

## 2025-05-15 RX ORDER — SODIUM CHLORIDE 0.9 % (FLUSH) 0.9 %
10 SYRINGE (ML) INJECTION
Status: DISCONTINUED | OUTPATIENT
Start: 2025-05-15 | End: 2025-05-15 | Stop reason: HOSPADM

## 2025-05-15 RX ORDER — PHENYLEPHRINE HYDROCHLORIDE 10 MG/ML
INJECTION INTRAVENOUS
Status: DISCONTINUED | OUTPATIENT
Start: 2025-05-15 | End: 2025-05-15

## 2025-05-15 RX ORDER — BUPIVACAINE HYDROCHLORIDE 5 MG/ML
INJECTION, SOLUTION EPIDURAL; INTRACAUDAL; PERINEURAL
Status: DISCONTINUED | OUTPATIENT
Start: 2025-05-15 | End: 2025-05-15 | Stop reason: HOSPADM

## 2025-05-15 RX ORDER — HYDROMORPHONE HYDROCHLORIDE 1 MG/ML
INJECTION, SOLUTION INTRAMUSCULAR; INTRAVENOUS; SUBCUTANEOUS
Status: DISCONTINUED | OUTPATIENT
Start: 2025-05-15 | End: 2025-05-15

## 2025-05-15 RX ORDER — PROPOFOL 10 MG/ML
INJECTION, EMULSION INTRAVENOUS
Status: DISCONTINUED | OUTPATIENT
Start: 2025-05-15 | End: 2025-05-15

## 2025-05-15 RX ORDER — HYDROCODONE BITARTRATE AND ACETAMINOPHEN 10; 325 MG/1; MG/1
1 TABLET ORAL EVERY 8 HOURS PRN
Qty: 28 TABLET | Refills: 0 | Status: SHIPPED | OUTPATIENT
Start: 2025-05-15

## 2025-05-15 RX ORDER — DEXAMETHASONE SODIUM PHOSPHATE 4 MG/ML
INJECTION, SOLUTION INTRA-ARTICULAR; INTRALESIONAL; INTRAMUSCULAR; INTRAVENOUS; SOFT TISSUE
Status: DISCONTINUED | OUTPATIENT
Start: 2025-05-15 | End: 2025-05-15

## 2025-05-15 RX ORDER — SODIUM CHLORIDE, SODIUM LACTATE, POTASSIUM CHLORIDE, CALCIUM CHLORIDE 600; 310; 30; 20 MG/100ML; MG/100ML; MG/100ML; MG/100ML
INJECTION, SOLUTION INTRAVENOUS CONTINUOUS
Status: DISCONTINUED | OUTPATIENT
Start: 2025-05-15 | End: 2025-05-15 | Stop reason: HOSPADM

## 2025-05-15 RX ORDER — MUPIROCIN 20 MG/G
OINTMENT TOPICAL
Status: DISCONTINUED | OUTPATIENT
Start: 2025-05-15 | End: 2025-05-15 | Stop reason: HOSPADM

## 2025-05-15 RX ORDER — MIDAZOLAM HYDROCHLORIDE 1 MG/ML
INJECTION INTRAMUSCULAR; INTRAVENOUS
Status: DISCONTINUED | OUTPATIENT
Start: 2025-05-15 | End: 2025-05-15

## 2025-05-15 RX ORDER — DEXMEDETOMIDINE HYDROCHLORIDE 100 UG/ML
INJECTION, SOLUTION INTRAVENOUS
Status: DISCONTINUED | OUTPATIENT
Start: 2025-05-15 | End: 2025-05-15

## 2025-05-15 RX ORDER — KETAMINE HCL IN 0.9 % NACL 50 MG/5 ML
SYRINGE (ML) INTRAVENOUS
Status: DISCONTINUED | OUTPATIENT
Start: 2025-05-15 | End: 2025-05-15

## 2025-05-15 RX ORDER — CEFAZOLIN SODIUM 1 G/3ML
2 INJECTION, POWDER, FOR SOLUTION INTRAMUSCULAR; INTRAVENOUS
Status: COMPLETED | OUTPATIENT
Start: 2025-05-15 | End: 2025-05-15

## 2025-05-15 RX ORDER — ONDANSETRON HYDROCHLORIDE 2 MG/ML
INJECTION, SOLUTION INTRAVENOUS
Status: DISCONTINUED | OUTPATIENT
Start: 2025-05-15 | End: 2025-05-15

## 2025-05-15 RX ORDER — KETOROLAC TROMETHAMINE 30 MG/ML
INJECTION, SOLUTION INTRAMUSCULAR; INTRAVENOUS
Status: DISCONTINUED | OUTPATIENT
Start: 2025-05-15 | End: 2025-05-15

## 2025-05-15 RX ORDER — CYCLOBENZAPRINE HCL 10 MG
10 TABLET ORAL 3 TIMES DAILY PRN
Qty: 28 TABLET | Refills: 0 | Status: SHIPPED | OUTPATIENT
Start: 2025-05-15 | End: 2025-05-25

## 2025-05-15 RX ORDER — SODIUM CHLORIDE 9 MG/ML
INJECTION, SOLUTION INTRAVENOUS CONTINUOUS
Status: DISCONTINUED | OUTPATIENT
Start: 2025-05-15 | End: 2025-05-15 | Stop reason: HOSPADM

## 2025-05-15 RX ORDER — FENTANYL CITRATE 50 UG/ML
INJECTION, SOLUTION INTRAMUSCULAR; INTRAVENOUS
Status: DISCONTINUED | OUTPATIENT
Start: 2025-05-15 | End: 2025-05-15

## 2025-05-15 RX ORDER — PROMETHAZINE HYDROCHLORIDE 25 MG/ML
12.5 INJECTION, SOLUTION INTRAMUSCULAR; INTRAVENOUS
Status: DISCONTINUED | OUTPATIENT
Start: 2025-05-15 | End: 2025-05-15 | Stop reason: HOSPADM

## 2025-05-15 RX ORDER — OXYCODONE HYDROCHLORIDE 5 MG/1
5 TABLET ORAL
Status: DISCONTINUED | OUTPATIENT
Start: 2025-05-15 | End: 2025-05-15 | Stop reason: HOSPADM

## 2025-05-15 RX ORDER — GLUCAGON 1 MG
1 KIT INJECTION
Status: DISCONTINUED | OUTPATIENT
Start: 2025-05-15 | End: 2025-05-15 | Stop reason: HOSPADM

## 2025-05-15 RX ORDER — NAPROXEN 500 MG/1
500 TABLET ORAL 2 TIMES DAILY
Qty: 28 TABLET | Refills: 0 | Status: SHIPPED | OUTPATIENT
Start: 2025-05-15

## 2025-05-15 RX ORDER — MEPERIDINE HYDROCHLORIDE 25 MG/ML
12.5 INJECTION INTRAMUSCULAR; INTRAVENOUS; SUBCUTANEOUS EVERY 10 MIN PRN
Status: DISCONTINUED | OUTPATIENT
Start: 2025-05-15 | End: 2025-05-15 | Stop reason: HOSPADM

## 2025-05-15 RX ORDER — LIDOCAINE HYDROCHLORIDE 10 MG/ML
INJECTION, SOLUTION INFILTRATION; PERINEURAL
Status: DISCONTINUED | OUTPATIENT
Start: 2025-05-15 | End: 2025-05-15 | Stop reason: HOSPADM

## 2025-05-15 RX ORDER — EPINEPHRINE 1 MG/ML
INJECTION, SOLUTION, CONCENTRATE INTRAVENOUS
Status: DISCONTINUED | OUTPATIENT
Start: 2025-05-15 | End: 2025-05-15 | Stop reason: HOSPADM

## 2025-05-15 RX ORDER — MIDAZOLAM HYDROCHLORIDE 2 MG/2ML
5 INJECTION, SOLUTION INTRAMUSCULAR; INTRAVENOUS ONCE AS NEEDED
Status: COMPLETED | OUTPATIENT
Start: 2025-05-15 | End: 2025-05-15

## 2025-05-15 RX ORDER — ONDANSETRON HYDROCHLORIDE 2 MG/ML
4 INJECTION, SOLUTION INTRAVENOUS DAILY PRN
Status: DISCONTINUED | OUTPATIENT
Start: 2025-05-15 | End: 2025-05-15 | Stop reason: HOSPADM

## 2025-05-15 RX ADMIN — MIDAZOLAM HYDROCHLORIDE 5 MG: 1 INJECTION, SOLUTION INTRAMUSCULAR; INTRAVENOUS at 09:05

## 2025-05-15 RX ADMIN — FENTANYL CITRATE 25 MCG: 50 INJECTION INTRAMUSCULAR; INTRAVENOUS at 11:05

## 2025-05-15 RX ADMIN — SODIUM CHLORIDE, POTASSIUM CHLORIDE, SODIUM LACTATE AND CALCIUM CHLORIDE: 600; 310; 30; 20 INJECTION, SOLUTION INTRAVENOUS at 09:05

## 2025-05-15 RX ADMIN — HYDROMORPHONE HYDROCHLORIDE 0.25 MG: 1 INJECTION, SOLUTION INTRAMUSCULAR; INTRAVENOUS; SUBCUTANEOUS at 11:05

## 2025-05-15 RX ADMIN — CEFAZOLIN 2 G: 330 INJECTION, POWDER, FOR SOLUTION INTRAMUSCULAR; INTRAVENOUS at 10:05

## 2025-05-15 RX ADMIN — MIDAZOLAM HYDROCHLORIDE 2 MG: 1 INJECTION, SOLUTION INTRAMUSCULAR; INTRAVENOUS at 10:05

## 2025-05-15 RX ADMIN — ONDANSETRON 4 MG: 2 INJECTION INTRAMUSCULAR; INTRAVENOUS at 02:05

## 2025-05-15 RX ADMIN — HYDROMORPHONE HYDROCHLORIDE 0.25 MG: 1 INJECTION, SOLUTION INTRAMUSCULAR; INTRAVENOUS; SUBCUTANEOUS at 12:05

## 2025-05-15 RX ADMIN — FENTANYL CITRATE 25 MCG: 50 INJECTION INTRAMUSCULAR; INTRAVENOUS at 10:05

## 2025-05-15 RX ADMIN — FENTANYL CITRATE 50 MCG: 50 INJECTION INTRAMUSCULAR; INTRAVENOUS at 10:05

## 2025-05-15 RX ADMIN — PROPOFOL 200 MG: 10 INJECTION, EMULSION INTRAVENOUS at 10:05

## 2025-05-15 RX ADMIN — PHENYLEPHRINE HYDROCHLORIDE 200 MCG: 10 INJECTION INTRAVENOUS at 11:05

## 2025-05-15 RX ADMIN — Medication 10 MG: at 11:05

## 2025-05-15 RX ADMIN — DEXMEDETOMIDINE 20 MCG: 200 INJECTION, SOLUTION INTRAVENOUS at 11:05

## 2025-05-15 RX ADMIN — SODIUM CHLORIDE, POTASSIUM CHLORIDE, SODIUM LACTATE AND CALCIUM CHLORIDE: 600; 310; 30; 20 INJECTION, SOLUTION INTRAVENOUS at 10:05

## 2025-05-15 RX ADMIN — DIAZEPAM 10 MG: 5 TABLET ORAL at 07:05

## 2025-05-15 RX ADMIN — DEXAMETHASONE SODIUM PHOSPHATE 8 MG: 4 INJECTION, SOLUTION INTRA-ARTICULAR; INTRALESIONAL; INTRAMUSCULAR; INTRAVENOUS; SOFT TISSUE at 10:05

## 2025-05-15 RX ADMIN — LIDOCAINE HYDROCHLORIDE 100 MG: 20 INJECTION INTRAVENOUS at 10:05

## 2025-05-15 RX ADMIN — Medication 10 MG: at 12:05

## 2025-05-15 RX ADMIN — DEXMEDETOMIDINE 20 MCG: 200 INJECTION, SOLUTION INTRAVENOUS at 01:05

## 2025-05-15 RX ADMIN — OXYCODONE HYDROCHLORIDE 5 MG: 5 TABLET ORAL at 02:05

## 2025-05-15 RX ADMIN — ONDANSETRON 4 MG: 2 INJECTION INTRAMUSCULAR; INTRAVENOUS at 12:05

## 2025-05-15 RX ADMIN — KETOROLAC TROMETHAMINE 30 MG: 30 INJECTION INTRAMUSCULAR; INTRAVENOUS at 12:05

## 2025-05-15 RX ADMIN — Medication 30 MG: at 10:05

## 2025-05-15 RX ADMIN — HYDROMORPHONE HYDROCHLORIDE 0.25 MG: 1 INJECTION, SOLUTION INTRAMUSCULAR; INTRAVENOUS; SUBCUTANEOUS at 01:05

## 2025-05-15 RX ADMIN — GLYCOPYRROLATE 0.1 MG: 0.2 INJECTION INTRAMUSCULAR; INTRAVENOUS at 10:05

## 2025-05-15 RX ADMIN — DEXMEDETOMIDINE 20 MCG: 200 INJECTION, SOLUTION INTRAVENOUS at 10:05

## 2025-05-15 NOTE — ANESTHESIA PROCEDURE NOTES
Intubation    Date/Time: 5/15/2025 10:25 AM    Performed by: Ric Coronado CRNA  Authorized by: Violet Vargas MD    Intubation:     Induction:  Intravenous    Intubated:  Postinduction    Mask Ventilation:  Easy mask    Attempts:  1    Attempted By:  CRNA    Difficult Airway Encountered?: No      Airway Device:  Supraglottic airway/LMA    Airway Device Size:  5.0    Style/Cuff Inflation:  Uncuffed    Placement Verified By:  Capnometry    Complicating Factors:  None    Findings Post-Intubation:  BS equal bilateral and atraumatic/condition of teeth unchanged  Notes:      No leak at 20cm H2O

## 2025-05-15 NOTE — TRANSFER OF CARE
Anesthesia Transfer of Care Note    Patient: Marco A Huertas    Procedure(s) Performed: Procedure(s) (LRB):  ARTHROSCOPY, KNEE (Left)  RECONSTRUCTION, KNEE, ACL, USING GRAFT (Left)    Patient location: PACU    Anesthesia Type: general    Transport from OR: Transported from OR on room air with adequate spontaneous ventilation    Post pain: adequate analgesia    Post assessment: no apparent anesthetic complications and tolerated procedure well    Post vital signs: stable    Level of consciousness: sedated    Nausea/Vomiting: no nausea/vomiting    Complications: none    Transfer of care protocol was followedComments: Report to BURKE Ott      Last vitals: Visit Vitals  BP (!) 104/54 (BP Location: Left arm, Patient Position: Lying)   Pulse 81   Temp 36.4 °C (97.6 °F) (Tympanic)   Resp 15   Wt 88.8 kg (195 lb 12.8 oz)   SpO2 96%   BMI 28.09 kg/m²

## 2025-05-16 NOTE — ANESTHESIA POSTPROCEDURE EVALUATION
Anesthesia Post Evaluation    Patient: Marco A Huertas    Procedure(s) Performed: Procedure(s) (LRB):  ARTHROSCOPY, KNEE (Left)  RECONSTRUCTION, KNEE, ACL, USING GRAFT (Left)    Final Anesthesia Type: general      Patient location during evaluation: PACU  Patient participation: Yes- Able to Participate  Level of consciousness: awake and responds to stimulation  Post-procedure vital signs: reviewed and stable  Pain management: adequate  Airway patency: patent    PONV status at discharge: No PONV  Anesthetic complications: no      Cardiovascular status: blood pressure returned to baseline  Respiratory status: unassisted  Hydration status: euvolemic  Follow-up not needed.          Vitals Value Taken Time   /65 05/15/25 15:17   Temp 36.6 °C (97.9 °F) 05/15/25 15:02   Pulse 72 05/15/25 15:21   Resp 20 05/15/25 15:17   SpO2 98 % 05/15/25 15:21   Vitals shown include unfiled device data.      Event Time   Out of Recovery 14:05:00         Pain/Yeu Score: Pain Rating Prior to Med Admin: 6 (5/15/2025  3:10 PM)  Pain Rating Post Med Admin: 4 (5/15/2025  3:10 PM)  Yue Score: 10 (5/15/2025  2:09 PM)  Modified Yue Score: 19 (5/15/2025  3:17 PM)

## 2025-05-16 NOTE — ANESTHESIA POSTPROCEDURE EVALUATION
Anesthesia Post Evaluation    Patient: Marco A Huertas    Procedure(s) Performed: Procedure(s) (LRB):  ARTHROSCOPY, KNEE (Left)  RECONSTRUCTION, KNEE, ACL, USING GRAFT (Left)    [facility] Anesthesia Post Op Evaluation      Vitals Value Taken Time   /65 05/15/25 15:17   Temp 36.6 °C (97.9 °F) 05/15/25 15:02   Pulse 72 05/15/25 15:21   Resp 20 05/15/25 15:17   SpO2 98 % 05/15/25 15:21   Vitals shown include unfiled device data.      Event Time   Out of Recovery 14:05:00         Pain/Yue Score: Pain Rating Prior to Med Admin: 6 (5/15/2025  3:10 PM)  Pain Rating Post Med Admin: 4 (5/15/2025  3:10 PM)  Yue Score: 10 (5/15/2025  2:09 PM)  Modified Yue Score: 19 (5/15/2025  3:17 PM)

## 2025-05-19 VITALS
BODY MASS INDEX: 28.09 KG/M2 | DIASTOLIC BLOOD PRESSURE: 65 MMHG | TEMPERATURE: 98 F | RESPIRATION RATE: 20 BRPM | SYSTOLIC BLOOD PRESSURE: 129 MMHG | HEART RATE: 60 BPM | WEIGHT: 195.81 LBS | OXYGEN SATURATION: 100 %

## 2025-05-19 NOTE — PROGRESS NOTES
Faculty Attestation: I was present and scrubbed throughout the key elements of the procedure.  I agree with the resident's findings and description.    Jovan Jeff  Orthopaedic Surgery

## 2025-05-28 ENCOUNTER — OFFICE VISIT (OUTPATIENT)
Dept: ORTHOPEDICS | Facility: CLINIC | Age: 20
End: 2025-05-28
Payer: MEDICAID

## 2025-05-28 VITALS
WEIGHT: 199.31 LBS | OXYGEN SATURATION: 96 % | DIASTOLIC BLOOD PRESSURE: 68 MMHG | BODY MASS INDEX: 28.53 KG/M2 | TEMPERATURE: 98 F | RESPIRATION RATE: 20 BRPM | HEART RATE: 93 BPM | HEIGHT: 70 IN | SYSTOLIC BLOOD PRESSURE: 106 MMHG

## 2025-05-28 DIAGNOSIS — S83.512A RUPTURE OF ANTERIOR CRUCIATE LIGAMENT OF LEFT KNEE, INITIAL ENCOUNTER: Primary | ICD-10-CM

## 2025-05-28 PROCEDURE — 99213 OFFICE O/P EST LOW 20 MIN: CPT | Mod: PBBFAC

## 2025-05-28 RX ORDER — NAPROXEN 500 MG/1
500 TABLET ORAL 2 TIMES DAILY
Qty: 20 TABLET | Refills: 0 | Status: SHIPPED | OUTPATIENT
Start: 2025-05-28

## 2025-05-28 RX ORDER — CYCLOBENZAPRINE HCL 10 MG
10 TABLET ORAL 3 TIMES DAILY PRN
Qty: 30 TABLET | Refills: 0 | Status: SHIPPED | OUTPATIENT
Start: 2025-05-28 | End: 2025-06-07

## 2025-05-28 NOTE — PROGRESS NOTES
Faculty Attestation: Marco A Huertas  was seen at [facility] in the Orthopaedic Clinic in the outpatient department at a [facility]. Discussed with the resident at the time of the visit.  I participated in the management of the patient and was immediately available throughout the encounter. History of Present Illness, Physical Exam, and Assessment and Plan reviewed. Treatment plan is reasonable and appropriate. Compliance with treatment recommendations is important. No procedure was performed.     Sarkis Armas MD  Orthopedic Surgery Chief

## 2025-05-28 NOTE — PROGRESS NOTES
[facility]  Established Patient Office Visit  05/28/2025       Patient ID: Maroc A Huertas  YOB: 2005  MRN: [id number]    Chief Complaint: No chief complaint on file.    Past Orthopaedic Surgeries:   05/15/25 LEFT knee arthroscopy with ACL reconstruction using hamstring autograft    HPI:  Marco A Huertas is a 19 y.o. male status post left knee arthroscopy with ACL reconstruction using hamstring autograft performed on May 15, 2025 here for 1st follow up appointment.    Patient is doing very well.  Patient states that he has been adherent to his brace use and is weight-bearing as tolerated while locked in extension.  Patient has only done a little bit of knee flexion and is apprehensive because he is afraid of rerupture.  Patient denies any wound healing issues.  Patient says that his pain is extremely well controlled in fact he has not even had to use any of his narcotics.  He finds the naproxen in the Flexeril very helpful.  Patient has been taking his aspirin as directed.  Patient denies any new onset numbness or tingling.  At 1st the patient had some hamstring cramping however that has gone away.    12 point ROS performed and negative except as above.     Past Medical History:    No past medical history on file.  Past Surgical History:   Procedure Laterality Date    ARTHROSCOPY OF KNEE Left 5/15/2025    Procedure: ARTHROSCOPY, KNEE;  Surgeon: Mikey Jeff MD;  Location: [facility];  Service: Orthopedics;  Laterality: Left;    MYRINGOTOMY W/ TUBES      RECONSTRUCTION OF ANTERIOR CRUCIATE LIGAMENT USING GRAFT Left 5/15/2025    Procedure: RECONSTRUCTION, KNEE, ACL, USING GRAFT;  Surgeon: Mikey Jeff MD;  Location: [facility];  Service: Orthopedics;  Laterality: Left;     Family History   Problem Relation Name Age of Onset    Alcohol abuse Maternal Grandmother Cady jordy     Drug abuse Maternal Grandmother Cady jordy     Alcohol abuse Paternal Grandfather Reinaldo  betina     COPD Paternal Grandfather Reinaldo moffett     Diabetes Paternal Grandmother Charisse moffett     Early death Paternal Grandmother Charisse moffett     Hearing loss Paternal Grandmother Charisse moffett     Heart disease Paternal Grandmother Charisse moffett      Social History[1]  Medication List with Changes/Refills   Current Medications    ASPIRIN 81 MG CHEW    Take 1 tablet (81 mg total) by mouth 2 (two) times a day.    DEXBROMPHENIRAMINE-PHENYLEPH (ALAHIST PE) 2-7.5 MG TAB    Take 2 mg by mouth 3 (three) times daily as needed (congestion).    GABAPENTIN (NEURONTIN) 300 MG CAPSULE    Take 1 capsule (300 mg total) by mouth 3 (three) times daily.    HYDROCODONE-ACETAMINOPHEN (NORCO)  MG PER TABLET    Take 1 tablet by mouth every 8 (eight) hours as needed for Pain.    LEVOCETIRIZINE (XYZAL) 5 MG TABLET    1 tab(s) orally once a day (in the evening)    NAPROXEN (NAPROSYN) 500 MG TABLET    Take 1 tablet (500 mg total) by mouth 2 (two) times daily.     Review of patient's allergies indicates:  No Known Allergies    Physical Exam:  Left Lower extremity:  Well healing incisions without dehiscence drainage or erythema  No TTP over the greater trochanter, thigh, medial/lateral knee joint line, leg, ankle, foot  Motor intact: EHL/FHL/TA/GSC  SILT: DP/SP/T/Stoddard/Sa  Knee range of motion from full extension to approximately 30° flexion, very apprehensive  Palpable DP pulse    Imaging independently interpreted:  No new films    Assessment and Plan:  Marco A Moffett is a 19 y.o. male status post left knee arthroscopy with ACL reconstruction using hamstring autograft performed on May 15, 2025.    - referral to Physical therapy to begin postop protocol  - we will follow the [facility] hamstring autograft rehab protocol  - patient is to continue weight-bearing as tolerated through the brace for the next 2 weeks and then may start weaning off the brace with the exception of high-risk for fall environments  - patient may begin  passive and active flexion from 0-90 degrees  - we will see the patient back in 1 month to ensure that he is progressing his range of motion as expected  - rehab protocol pronounced as well as more Flexeril and naproxen given to patient in clinic today    Inocente Caldera MD  [employer] Orthopedic Surgery PGY-3               [1]   Social History  Socioeconomic History    Marital status: Single   Tobacco Use    Smoking status: Never     Passive exposure: Never    Smokeless tobacco: Never    Tobacco comments:     Glenn pouches-5-6 per day   Substance and Sexual Activity    Alcohol use: Yes     Alcohol/week: 3.0 standard drinks of alcohol     Types: 1 Glasses of wine, 2 Cans of beer per week     Comment: weekends occ    Drug use: Never    Sexual activity: Not Currently     Partners: Female     Birth control/protection: Condom

## 2025-06-30 ENCOUNTER — OFFICE VISIT (OUTPATIENT)
Dept: ORTHOPEDICS | Facility: CLINIC | Age: 20
End: 2025-06-30
Payer: MEDICAID

## 2025-06-30 VITALS
OXYGEN SATURATION: 99 % | TEMPERATURE: 98 F | HEART RATE: 73 BPM | SYSTOLIC BLOOD PRESSURE: 102 MMHG | BODY MASS INDEX: 27.94 KG/M2 | DIASTOLIC BLOOD PRESSURE: 69 MMHG | WEIGHT: 195.13 LBS | HEIGHT: 70 IN | RESPIRATION RATE: 20 BRPM

## 2025-06-30 DIAGNOSIS — Z98.890 S/P ACL RECONSTRUCTION: Primary | ICD-10-CM

## 2025-06-30 PROCEDURE — 99213 OFFICE O/P EST LOW 20 MIN: CPT | Mod: PBBFAC

## 2025-06-30 PROCEDURE — 3078F DIAST BP <80 MM HG: CPT | Mod: CPTII,,, | Performed by: SPECIALIST

## 2025-06-30 PROCEDURE — 1159F MED LIST DOCD IN RCRD: CPT | Mod: CPTII,,, | Performed by: SPECIALIST

## 2025-06-30 PROCEDURE — 3074F SYST BP LT 130 MM HG: CPT | Mod: CPTII,,, | Performed by: SPECIALIST

## 2025-06-30 PROCEDURE — 99024 POSTOP FOLLOW-UP VISIT: CPT | Mod: ,,, | Performed by: SPECIALIST

## 2025-06-30 NOTE — PROGRESS NOTES
[facility] Orthopa[facility] Progress Note     In brief, 20 y.o. male who previously sustained a L knee ACL rupture after a twisting injury and underwent L knee ATS w/ ACLR using hamstring autograft (DOS: 5/15/25). He presents today for postoperative follow-up.    HPI: He reports that his pain is very well controlled, he is not using any pain medication. He has been attending PT 3 times per week and has been making good progress with them and has regained majority of his L knee ROM. He is using his brace whenever performing high-risk activities. He has no numbness or tingling in the LLE.    PMH:   Past Medical History:   Diagnosis Date    Known health problems: none        PSH:   Past Surgical History:   Procedure Laterality Date    ARTHROSCOPY OF KNEE Left 5/15/2025    Procedure: ARTHROSCOPY, KNEE;  Surgeon: Mikey Jeff MD;  Location: [facility];  Service: Orthopedics;  Laterality: Left;    MYRINGOTOMY W/ TUBES      RECONSTRUCTION OF ANTERIOR CRUCIATE LIGAMENT USING GRAFT Left 5/15/2025    Procedure: RECONSTRUCTION, KNEE, ACL, USING GRAFT;  Surgeon: Mikey Jeff MD;  Location: [facility];  Service: Orthopedics;  Laterality: Left;       SH: Social History[1]    FH:   Family History   Problem Relation Name Age of Onset    Alcohol abuse Maternal Grandmother Cady jordy     Drug abuse Maternal Grandmother Cady jordy     Alcohol abuse Paternal Grandfather Reinaldo moffett     COPD Paternal Grandfather Reinaldo moffett     Diabetes Paternal Grandmother Charisse moffett     Early death Paternal Grandmother Charisse moffett     Hearing loss Paternal Grandmother Charisse moffett     Heart disease Paternal Grandmother Charisse moffett        Allergies: Review of patient's allergies indicates:  No Known Allergies     Physical Exam:    Body mass index is 27.99 kg/m².    Vitals:    06/30/25 1506   BP: 102/69   Pulse: 73   Resp: 20   Temp: 98.4 °F (36.9 °C)     General: NAD, AAOx3    MSK LLE  Prior incisions well-healed  No TTP on the  medial or lateral joint line  Knee ROM 0 - 130 degrees  Anterior Drawer 1A  Lachman 1A  SILT Sa/Stoddard/SP/DP/T  Motor intact EHL/FHL/GSC/TA  DP 2+     Imaging:  No new imaging obtained during today's visit     Assessment/Plan: 20 y.o. male who previously sustained a L knee ACL rupture after a twisting injury and underwent L knee ATS w/ ACLR using hamstring autograft (DOS: 5/15/25). He presents today for postoperative follow-up and is progressing very well.     - WBAT LLE, utilize hinged knee brace when performing high-risk activities  - Continue performing PT utilizing the [employer] ACL Hamstring Autograft protocol; patient okay to progress into late post op phase and into transitional phase as able  - Continue using OTC pain medications as needed  - Follow-up in 6 weeks for re-evaluation     Chaim Morales MD, ATC  [employer] Orthopaedic Surgery, PGY-3         [1]   Social History  Socioeconomic History    Marital status: Single   Tobacco Use    Smoking status: Never     Passive exposure: Never    Smokeless tobacco: Never    Tobacco comments:     Glenn pouches-5-6 per day   Substance and Sexual Activity    Alcohol use: Yes     Alcohol/week: 3.0 standard drinks of alcohol     Types: 1 Glasses of wine, 2 Cans of beer per week     Comment: weekends occ    Drug use: Never    Sexual activity: Not Currently     Partners: Female     Birth control/protection: Condom

## 2025-07-01 NOTE — PROGRESS NOTES
Faculty Attestation: Marco A Huertas  was seen at [facility] in the Orthopaedic Clinic. Patient seen and evaluated at the time of the visit. History of Present Illness, Physical Exam, and Assessment and Plan reviewed. Treatment plan is reasonable and appropriate. Compliance with treatment recommendations is important. No procedure was performed.     Jovan Jeff MD  Orthopaedic Surgery

## 2025-08-11 ENCOUNTER — OFFICE VISIT (OUTPATIENT)
Dept: ORTHOPEDICS | Facility: CLINIC | Age: 20
End: 2025-08-11
Payer: MEDICAID

## 2025-08-11 VITALS
OXYGEN SATURATION: 98 % | HEIGHT: 71 IN | WEIGHT: 198.88 LBS | HEART RATE: 68 BPM | SYSTOLIC BLOOD PRESSURE: 110 MMHG | BODY MASS INDEX: 27.84 KG/M2 | TEMPERATURE: 98 F | DIASTOLIC BLOOD PRESSURE: 71 MMHG

## 2025-08-11 DIAGNOSIS — Z98.890 S/P ACL RECONSTRUCTION: Primary | ICD-10-CM

## 2025-08-11 PROCEDURE — 1159F MED LIST DOCD IN RCRD: CPT | Mod: CPTII,,, | Performed by: SPECIALIST

## 2025-08-11 PROCEDURE — 3078F DIAST BP <80 MM HG: CPT | Mod: CPTII,,, | Performed by: SPECIALIST

## 2025-08-11 PROCEDURE — 99213 OFFICE O/P EST LOW 20 MIN: CPT | Mod: PBBFAC

## 2025-08-11 PROCEDURE — 3074F SYST BP LT 130 MM HG: CPT | Mod: CPTII,,, | Performed by: SPECIALIST

## 2025-08-11 PROCEDURE — 99499 UNLISTED E&M SERVICE: CPT | Mod: ,,, | Performed by: SPECIALIST

## (undated) DEVICE — SYR 10CC LUER LOCK

## (undated) DEVICE — SYR IRRIGATION BULB STER 60ML

## (undated) DEVICE — BLADE SURG STAINLESS STEEL #15

## (undated) DEVICE — SUT 3-0 MONOCRYL PLUS PS-2

## (undated) DEVICE — GOWN POLY REINF BRTH SLV XL

## (undated) DEVICE — MANIFOLD 4 PORT

## (undated) DEVICE — TOURNIQUET SB QC DP 34X4IN

## (undated) DEVICE — SAWBLADE TRANS TIB ACL

## (undated) DEVICE — DRESSING GAUZE XEROFORM 5X9

## (undated) DEVICE — SUT TAPE FIBERLOOP 1.3MM

## (undated) DEVICE — BRACE KNEE POST OP HNG PD 17IN

## (undated) DEVICE — SUT 38 FIBERWIRE #2

## (undated) DEVICE — BLADE VORTEX SHAVER 4.5MMX13CM

## (undated) DEVICE — NDL HYPO REG 25G X 1 1/2

## (undated) DEVICE — SUT TAPE TIGERLOOP 1.3MM

## (undated) DEVICE — #2 FIBERSNARE

## (undated) DEVICE — ADHESIVE DERMABOND ADVANCED

## (undated) DEVICE — SUT 2/0 36IN COATED VICRYL

## (undated) DEVICE — DRILL FLIPCUTTER III

## (undated) DEVICE — PROBE ARTHO ENERGY 90 DEG

## (undated) DEVICE — TUBING MEDI-VAC 20FT .25IN

## (undated) DEVICE — COVER TABLE HVY DTY 60X90IN

## (undated) DEVICE — TUBE SET INFLOW/OUTFLOW

## (undated) DEVICE — PACK SURGICAL KNEE SCOPE

## (undated) DEVICE — MARKER WRITESITE SKIN CHLRAPRP

## (undated) DEVICE — ELECTRODE PATIENT RETURN DISP

## (undated) DEVICE — KIT SURGICAL TURNOVER

## (undated) DEVICE — TOURNIQUET SB QC DP 24X4IN

## (undated) DEVICE — PENCIL ELECSURG ROCKER 15FT

## (undated) DEVICE — CLIPPER BLADE MOD 4406 (CAREF)

## (undated) DEVICE — DRAPE FULL SHEET 70X100IN

## (undated) DEVICE — BLADE GREAT WHITE 4.2 6/BX

## (undated) DEVICE — NDL FLTR 5MCRN BLNT TIP 18GX1

## (undated) DEVICE — SUT CTD VICRYL 0 UND BR CT

## (undated) DEVICE — GOWN POLY REINF X-LONG 2XL

## (undated) DEVICE — SOL NACL IRR 3000ML

## (undated) DEVICE — PAD PREP CUFFED NS 24X48IN

## (undated) DEVICE — YANKAUER FLEX NO VENT REG CAP